# Patient Record
Sex: FEMALE | Race: WHITE | Employment: OTHER | URBAN - METROPOLITAN AREA
[De-identification: names, ages, dates, MRNs, and addresses within clinical notes are randomized per-mention and may not be internally consistent; named-entity substitution may affect disease eponyms.]

---

## 2018-03-11 ENCOUNTER — HOSPITAL ENCOUNTER (EMERGENCY)
Age: 53
Discharge: HOME | End: 2018-03-11
Attending: EMERGENCY MEDICINE
Payer: COMMERCIAL

## 2018-03-11 VITALS
HEART RATE: 100 BPM | TEMPERATURE: 97.5 F | DIASTOLIC BLOOD PRESSURE: 101 MMHG | BODY MASS INDEX: 17.83 KG/M2 | SYSTOLIC BLOOD PRESSURE: 143 MMHG | HEIGHT: 65 IN | OXYGEN SATURATION: 100 % | RESPIRATION RATE: 16 BRPM | WEIGHT: 107 LBS

## 2018-03-11 DIAGNOSIS — T78.40XA ALLERGIC REACTION, INITIAL ENCOUNTER: Primary | ICD-10-CM

## 2018-03-11 PROCEDURE — 74011250636 HC RX REV CODE- 250/636: Performed by: EMERGENCY MEDICINE

## 2018-03-11 PROCEDURE — 74011000250 HC RX REV CODE- 250: Performed by: EMERGENCY MEDICINE

## 2018-03-11 PROCEDURE — 96374 THER/PROPH/DIAG INJ IV PUSH: CPT

## 2018-03-11 PROCEDURE — 74011636637 HC RX REV CODE- 636/637: Performed by: EMERGENCY MEDICINE

## 2018-03-11 PROCEDURE — 96375 TX/PRO/DX INJ NEW DRUG ADDON: CPT

## 2018-03-11 PROCEDURE — 99283 EMERGENCY DEPT VISIT LOW MDM: CPT

## 2018-03-11 RX ORDER — PREDNISONE 10 MG/1
TABLET ORAL
Qty: 1 PACKAGE | Refills: 0 | Status: SHIPPED | OUTPATIENT
Start: 2018-03-11

## 2018-03-11 RX ORDER — DIPHENHYDRAMINE HYDROCHLORIDE 50 MG/ML
25 INJECTION, SOLUTION INTRAMUSCULAR; INTRAVENOUS
Status: COMPLETED | OUTPATIENT
Start: 2018-03-11 | End: 2018-03-11

## 2018-03-11 RX ORDER — FAMOTIDINE 10 MG/ML
20 INJECTION INTRAVENOUS
Status: DISCONTINUED | OUTPATIENT
Start: 2018-03-11 | End: 2018-03-11 | Stop reason: SDUPTHER

## 2018-03-11 RX ORDER — PROCHLORPERAZINE EDISYLATE 5 MG/ML
10 INJECTION INTRAMUSCULAR; INTRAVENOUS
Status: COMPLETED | OUTPATIENT
Start: 2018-03-11 | End: 2018-03-11

## 2018-03-11 RX ORDER — PREDNISONE 20 MG/1
60 TABLET ORAL
Status: COMPLETED | OUTPATIENT
Start: 2018-03-11 | End: 2018-03-11

## 2018-03-11 RX ADMIN — PREDNISONE 60 MG: 20 TABLET ORAL at 16:07

## 2018-03-11 RX ADMIN — PROCHLORPERAZINE EDISYLATE 10 MG: 5 INJECTION INTRAMUSCULAR; INTRAVENOUS at 16:08

## 2018-03-11 RX ADMIN — FAMOTIDINE 20 MG: 10 INJECTION, SOLUTION INTRAVENOUS at 16:08

## 2018-03-11 RX ADMIN — DIPHENHYDRAMINE HYDROCHLORIDE 25 MG: 50 INJECTION, SOLUTION INTRAMUSCULAR; INTRAVENOUS at 16:07

## 2018-03-11 NOTE — ED TRIAGE NOTES
Patient arrives from home for redness to face for 50 days. Patient saw a eye doctor twice for this issue of redness under her eyes but she does not believe she knows whats going on. \"Its moving. \" (in reguards to redness) Patient also saw primary care for this issue.

## 2018-03-11 NOTE — DISCHARGE INSTRUCTIONS
Allergic Reaction: Care Instructions  Your Care Instructions    An allergic reaction is an excessive response from your immune system to a medicine, chemical, food, insect bite, or other substance. A reaction can range from mild to life-threatening. Some people have a mild rash, hives, and itching or stomach cramps. In severe reactions, swelling of your tongue and throat can close up your airway so that you cannot breathe. Follow-up care is a key part of your treatment and safety. Be sure to make and go to all appointments, and call your doctor if you are having problems. It's also a good idea to know your test results and keep a list of the medicines you take. How can you care for yourself at home? · If you know what caused your allergic reaction, be sure to avoid it. Your allergy may become more severe each time you have a reaction. · Take an over-the-counter antihistamine, such as cetirizine (Zyrtec) or loratadine (Claritin), to treat mild symptoms. Read and follow directions on the label. Some antihistamines can make you feel sleepy. Do not give antihistamines to a child unless you have checked with your doctor first. Mild symptoms include sneezing or an itchy or runny nose; an itchy mouth; a few hives or mild itching; and mild nausea or stomach discomfort. · Do not scratch hives or a rash. Put a cold, moist towel on them or take cool baths to relieve itching. Put ice packs on hives, swelling, or insect stings for 10 to 15 minutes at a time. Put a thin cloth between the ice pack and your skin. Do not take hot baths or showers. They will make the itching worse. · Your doctor may prescribe a shot of epinephrine to carry with you in case you have a severe reaction. Learn how to give yourself the shot and keep it with you at all times. Make sure it is not . · Go to the emergency room every time you have a severe reaction, even if you have used your shot of epinephrine and are feeling better. Symptoms can come back after a shot. · Wear medical alert jewelry that lists your allergies. You can buy this at most drugstores. · If your child has a severe allergy, make sure that his or her teachers, babysitters, coaches, and other caregivers know about the allergy. They should have an epinephrine shot, know how and when to give it, and have a plan to take your child to the hospital.  When should you call for help? Give an epinephrine shot if:  ? · You think you are having a severe allergic reaction. ? · You have symptoms in more than one body area, such as mild nausea and an itchy mouth. ? After giving an epinephrine shot call 911, even if you feel better. ?Call 911 if:  ? · You have symptoms of a severe allergic reaction. These may include:  ¨ Sudden raised, red areas (hives) all over your body. ¨ Swelling of the throat, mouth, lips, or tongue. ¨ Trouble breathing. ¨ Passing out (losing consciousness). Or you may feel very lightheaded or suddenly feel weak, confused, or restless. ? · You have been given an epinephrine shot, even if you feel better. ?Call your doctor now or seek immediate medical care if:  ? · You have symptoms of an allergic reaction, such as:  ¨ A rash or hives (raised, red areas on the skin). ¨ Itching. ¨ Swelling. ¨ Belly pain, nausea, or vomiting. ? Watch closely for changes in your health, and be sure to contact your doctor if:  ? · You do not get better as expected. Where can you learn more? Go to http://freedom-mine.info/. Enter V363 in the search box to learn more about \"Allergic Reaction: Care Instructions. \"  Current as of: September 29, 2016  Content Version: 11.4  © 7151-1047 [employer]. Care instructions adapted under license by [employer] (which disclaims liability or warranty for this information).  If you have questions about a medical condition or this instruction, always ask your healthcare professional. [employer], [facility] disclaims any warranty or liability for your use of this information. We hope that we have addressed all of your medical concerns. The examination and treatment you received in the Emergency Department were for an emergent problem and were not intended as complete care. It is important that you follow up with your healthcare provider(s) for ongoing care. If your symptoms worsen or do not improve as expected, and you are unable to reach your usual health care provider(s), you should return to the Emergency Department. Today's healthcare is undergoing tremendous change, and patient satisfaction surveys are one of the many tools to assess the quality of medical care. You may receive a survey from the [employer] regarding your experience in the Emergency Department. I hope that your experience has been completely positive, particularly the medical care that I provided. As such, please participate in the survey; anything less than excellent does not meet my expectations or intentions. Thank you for allowing us to provide you with medical care today. We realize that you have many choices for your emergency care needs. Please choose us in the future for any continued health care needs. Darrick Che Claudean Freeze, [address]: [phone]            No results found for this or any previous visit (from the past 24 hour(s)). No results found.

## 2018-03-11 NOTE — ED NOTES
Pt rips IV out after medication administered, pt states 'i accidentally grabbed it, I just wannt go home'. MD notified and aware. Pt left before discharge instructions or vitals, pt ambulatory with steady gait and in no signs of distress.

## 2018-03-11 NOTE — PROGRESS NOTES

## 2018-03-11 NOTE — ED PROVIDER NOTES
HPI Comments: 47 y.o. female with past medical history significant for anxiety who presents from home with chief complaint of rash. Patient reports having a \"blister\" on the right side of her face on 1/21/18 (1.5 months ago) which caused redness and pain in her right eye. She reports being seen by her ophthalmologist twice, who diagnosed her with herpes zoster and prescribed her Valtrex. Patient reports taking the valtrex with some relief, but states that over the past 1 week, her rash has increased, transferred to the left side of her face and has spread down her neck. She states that the rash is painful, similar to a \"burning\" and states that her face feels like it is \"on fire\". Patient also reports having some associated headaches and constant blurred vision. There are no other acute medical concerns at this time. Social hx: every day smoker, + EtOH use, no drug use  PCP: No primary care provider on file. Opthalmology: Jatin Agrawal MD    Note written by Villa López, as dictated by Meron Osuna MD 3:23 PM      The history is provided by the patient. No  was used. Past Medical History:   Diagnosis Date    Anxiety        History reviewed. No pertinent surgical history. History reviewed. No pertinent family history. Social History     Social History    Marital status:      Spouse name: N/A    Number of children: N/A    Years of education: N/A     Occupational History    Not on file. Social History Main Topics    Smoking status: Current Every Day Smoker    Smokeless tobacco: Current User    Alcohol use Yes    Drug use: No    Sexual activity: Not on file     Other Topics Concern    Not on file     Social History Narrative    No narrative on file         ALLERGIES: Review of patient's allergies indicates no known allergies. Review of Systems   Constitutional: Negative for chills and fever.    Eyes: Positive for redness and visual disturbance. Skin: Positive for rash. Neurological: Positive for headaches. All other systems reviewed and are negative. Vitals:    03/11/18 1435   BP: (!) 143/101   Pulse: 100   Resp: 16   Temp: 97.5 °F (36.4 °C)   SpO2: 100%   Weight: 48.5 kg (107 lb)   Height: 5' 5\" (1.651 m)            Physical Exam   Constitutional: She is oriented to person, place, and time. She appears well-developed and well-nourished. No distress. HENT:   Head: Normocephalic and atraumatic. Eyes: Conjunctivae and EOM are normal. No scleral icterus. Periorbital erythema, non-cellulitic. Eyes appear normal.   Neck: Neck supple. No tracheal deviation present. Cardiovascular: Normal rate, regular rhythm, normal heart sounds and intact distal pulses. Exam reveals no gallop and no friction rub. No murmur heard. Pulmonary/Chest: Effort normal and breath sounds normal. She has no wheezes. She has no rales. Abdominal: Soft. She exhibits no distension. There is no tenderness. There is no rebound and no guarding. Musculoskeletal: She exhibits no edema. Neurological: She is alert and oriented to person, place, and time. Skin: Skin is warm and dry. No rash noted. Psychiatric: She has a normal mood and affect. Nursing note and vitals reviewed. Note written by Villa Lewis, as dictated by Rebecca Bruno MD 3:23 PM    [facility]      ED Course       Procedures  CONSULT NOTE:  3:58 PM Rebecca Bruno MD spoke with Dr. Renu Real, Consult for opthalmology. Discussed available diagnostic tests and clinical findings. Dr. Christelle Lucas will see patient tomorrow.

## 2023-04-18 NOTE — ED NOTES
Dr. Maribel Patricio at bedside to give written prescriptions after d/c instructions explained. Pt walked out of ED before getting written prescriptions. No

## 2025-03-08 ENCOUNTER — APPOINTMENT (OUTPATIENT)
Facility: HOSPITAL | Age: 60
DRG: 062 | End: 2025-03-08

## 2025-03-08 ENCOUNTER — HOSPITAL ENCOUNTER (INPATIENT)
Facility: HOSPITAL | Age: 60
LOS: 2 days | Discharge: HOME | DRG: 062 | End: 2025-03-10
Attending: STUDENT IN AN ORGANIZED HEALTH CARE EDUCATION/TRAINING PROGRAM | Admitting: INTERNAL MEDICINE

## 2025-03-08 DIAGNOSIS — I63.9 CEREBROVASCULAR ACCIDENT (CVA), UNSPECIFIED MECHANISM (HCC): Primary | ICD-10-CM

## 2025-03-08 LAB
ALBUMIN SERPL-MCNC: 3.4 G/DL (ref 3.5–5)
ALBUMIN/GLOB SERPL: 0.9 (ref 1.1–2.2)
ALP SERPL-CCNC: 128 U/L (ref 45–117)
ALT SERPL-CCNC: 15 U/L (ref 12–78)
ANION GAP SERPL CALC-SCNC: 4 MMOL/L (ref 2–12)
AST SERPL-CCNC: 20 U/L (ref 15–37)
BASOPHILS # BLD: 0.02 K/UL (ref 0–0.1)
BASOPHILS NFR BLD: 0.2 % (ref 0–1)
BILIRUB SERPL-MCNC: <0.1 MG/DL (ref 0.2–1)
BUN SERPL-MCNC: 14 MG/DL (ref 6–20)
BUN/CREAT SERPL: 27 (ref 12–20)
CALCIUM SERPL-MCNC: 8.7 MG/DL (ref 8.5–10.1)
CHLORIDE SERPL-SCNC: 111 MMOL/L (ref 97–108)
CO2 SERPL-SCNC: 26 MMOL/L (ref 21–32)
COMMENT:: NORMAL
CREAT SERPL-MCNC: 0.52 MG/DL (ref 0.55–1.02)
DIFFERENTIAL METHOD BLD: NORMAL
EKG ATRIAL RATE: 88 BPM
EKG DIAGNOSIS: NORMAL
EKG P AXIS: 81 DEGREES
EKG P-R INTERVAL: 174 MS
EKG Q-T INTERVAL: 370 MS
EKG QRS DURATION: 70 MS
EKG QTC CALCULATION (BAZETT): 447 MS
EKG R AXIS: 86 DEGREES
EKG T AXIS: 72 DEGREES
EKG VENTRICULAR RATE: 88 BPM
EOSINOPHIL # BLD: 0.2 K/UL (ref 0–0.4)
EOSINOPHIL NFR BLD: 2.4 % (ref 0–7)
ERYTHROCYTE [DISTWIDTH] IN BLOOD BY AUTOMATED COUNT: 12.2 % (ref 11.5–14.5)
GLOBULIN SER CALC-MCNC: 3.7 G/DL (ref 2–4)
GLUCOSE BLD STRIP.AUTO-MCNC: 80 MG/DL (ref 65–117)
GLUCOSE SERPL-MCNC: 94 MG/DL (ref 65–100)
HCT VFR BLD AUTO: 38.4 % (ref 35–47)
HGB BLD-MCNC: 12.8 G/DL (ref 11.5–16)
IMM GRANULOCYTES # BLD AUTO: 0.03 K/UL (ref 0–0.04)
IMM GRANULOCYTES NFR BLD AUTO: 0.4 % (ref 0–0.5)
INR PPP: 0.9 (ref 0.9–1.1)
LYMPHOCYTES # BLD: 3.04 K/UL (ref 0.8–3.5)
LYMPHOCYTES NFR BLD: 35.7 % (ref 12–49)
MCH RBC QN AUTO: 31.8 PG (ref 26–34)
MCHC RBC AUTO-ENTMCNC: 33.3 G/DL (ref 30–36.5)
MCV RBC AUTO: 95.3 FL (ref 80–99)
MONOCYTES # BLD: 0.64 K/UL (ref 0–1)
MONOCYTES NFR BLD: 7.5 % (ref 5–13)
NEUTS SEG # BLD: 4.58 K/UL (ref 1.8–8)
NEUTS SEG NFR BLD: 53.8 % (ref 32–75)
NRBC # BLD: 0 K/UL (ref 0–0.01)
NRBC BLD-RTO: 0 PER 100 WBC
PLATELET # BLD AUTO: 276 K/UL (ref 150–400)
PMV BLD AUTO: 9.5 FL (ref 8.9–12.9)
POTASSIUM SERPL-SCNC: 4 MMOL/L (ref 3.5–5.1)
PROT SERPL-MCNC: 7.1 G/DL (ref 6.4–8.2)
PROTHROMBIN TIME: 9.5 SEC (ref 9.2–11.2)
RBC # BLD AUTO: 4.03 M/UL (ref 3.8–5.2)
SERVICE CMNT-IMP: NORMAL
SODIUM SERPL-SCNC: 141 MMOL/L (ref 136–145)
SPECIMEN HOLD: NORMAL
TROPONIN I SERPL HS-MCNC: 4 NG/L (ref 0–51)
WBC # BLD AUTO: 8.5 K/UL (ref 3.6–11)

## 2025-03-08 PROCEDURE — 80053 COMPREHEN METABOLIC PANEL: CPT

## 2025-03-08 PROCEDURE — 85610 PROTHROMBIN TIME: CPT

## 2025-03-08 PROCEDURE — 84484 ASSAY OF TROPONIN QUANT: CPT

## 2025-03-08 PROCEDURE — APPNB60 APP NON BILLABLE TIME 46-60 MINS: Performed by: NURSE PRACTITIONER

## 2025-03-08 PROCEDURE — 85025 COMPLETE CBC W/AUTO DIFF WBC: CPT

## 2025-03-08 PROCEDURE — 2500000003 HC RX 250 WO HCPCS: Performed by: STUDENT IN AN ORGANIZED HEALTH CARE EDUCATION/TRAINING PROGRAM

## 2025-03-08 PROCEDURE — 96375 TX/PRO/DX INJ NEW DRUG ADDON: CPT

## 2025-03-08 PROCEDURE — 37195 THROMBOLYTIC THERAPY STROKE: CPT

## 2025-03-08 PROCEDURE — 3E03317 INTRODUCTION OF OTHER THROMBOLYTIC INTO PERIPHERAL VEIN, PERCUTANEOUS APPROACH: ICD-10-PCS | Performed by: STUDENT IN AN ORGANIZED HEALTH CARE EDUCATION/TRAINING PROGRAM

## 2025-03-08 PROCEDURE — 70450 CT HEAD/BRAIN W/O DYE: CPT

## 2025-03-08 PROCEDURE — 96374 THER/PROPH/DIAG INJ IV PUSH: CPT

## 2025-03-08 PROCEDURE — 6360000004 HC RX CONTRAST MEDICATION: Performed by: EMERGENCY MEDICINE

## 2025-03-08 PROCEDURE — 6360000002 HC RX W HCPCS

## 2025-03-08 PROCEDURE — 2500000003 HC RX 250 WO HCPCS: Performed by: PHYSICIAN ASSISTANT

## 2025-03-08 PROCEDURE — 0042T CT BRAIN PERFUSION: CPT

## 2025-03-08 PROCEDURE — 2000000000 HC ICU R&B

## 2025-03-08 PROCEDURE — 99285 EMERGENCY DEPT VISIT HI MDM: CPT

## 2025-03-08 PROCEDURE — 6370000000 HC RX 637 (ALT 250 FOR IP): Performed by: PHYSICIAN ASSISTANT

## 2025-03-08 PROCEDURE — 6360000002 HC RX W HCPCS: Performed by: STUDENT IN AN ORGANIZED HEALTH CARE EDUCATION/TRAINING PROGRAM

## 2025-03-08 PROCEDURE — 4A03X5D MEASUREMENT OF ARTERIAL FLOW, INTRACRANIAL, EXTERNAL APPROACH: ICD-10-PCS | Performed by: STUDENT IN AN ORGANIZED HEALTH CARE EDUCATION/TRAINING PROGRAM

## 2025-03-08 PROCEDURE — 93010 ELECTROCARDIOGRAM REPORT: CPT | Performed by: INTERNAL MEDICINE

## 2025-03-08 PROCEDURE — 70498 CT ANGIOGRAPHY NECK: CPT

## 2025-03-08 PROCEDURE — 93005 ELECTROCARDIOGRAM TRACING: CPT | Performed by: STUDENT IN AN ORGANIZED HEALTH CARE EDUCATION/TRAINING PROGRAM

## 2025-03-08 PROCEDURE — 82962 GLUCOSE BLOOD TEST: CPT

## 2025-03-08 RX ORDER — ASPIRIN 81 MG/1
81 TABLET, CHEWABLE ORAL DAILY
Status: DISCONTINUED | OUTPATIENT
Start: 2025-03-09 | End: 2025-03-10 | Stop reason: HOSPADM

## 2025-03-08 RX ORDER — SODIUM CHLORIDE 9 MG/ML
INJECTION, SOLUTION INTRAVENOUS PRN
Status: DISCONTINUED | OUTPATIENT
Start: 2025-03-08 | End: 2025-03-10 | Stop reason: HOSPADM

## 2025-03-08 RX ORDER — SODIUM CHLORIDE 0.9 % (FLUSH) 0.9 %
5-40 SYRINGE (ML) INJECTION EVERY 12 HOURS SCHEDULED
Status: DISCONTINUED | OUTPATIENT
Start: 2025-03-08 | End: 2025-03-10 | Stop reason: HOSPADM

## 2025-03-08 RX ORDER — SODIUM CHLORIDE 0.9 % (FLUSH) 0.9 %
5-40 SYRINGE (ML) INJECTION PRN
Status: DISCONTINUED | OUTPATIENT
Start: 2025-03-08 | End: 2025-03-10 | Stop reason: HOSPADM

## 2025-03-08 RX ORDER — LABETALOL HYDROCHLORIDE 5 MG/ML
10 INJECTION, SOLUTION INTRAVENOUS EVERY 6 HOURS PRN
Status: DISCONTINUED | OUTPATIENT
Start: 2025-03-08 | End: 2025-03-10 | Stop reason: HOSPADM

## 2025-03-08 RX ORDER — LABETALOL HYDROCHLORIDE 5 MG/ML
10 INJECTION, SOLUTION INTRAVENOUS EVERY 6 HOURS
Status: DISCONTINUED | OUTPATIENT
Start: 2025-03-08 | End: 2025-03-08

## 2025-03-08 RX ORDER — ONDANSETRON 4 MG/1
4 TABLET, ORALLY DISINTEGRATING ORAL EVERY 8 HOURS PRN
Status: DISCONTINUED | OUTPATIENT
Start: 2025-03-08 | End: 2025-03-10 | Stop reason: HOSPADM

## 2025-03-08 RX ORDER — ASPIRIN 300 MG/1
300 SUPPOSITORY RECTAL DAILY
Status: DISCONTINUED | OUTPATIENT
Start: 2025-03-09 | End: 2025-03-09

## 2025-03-08 RX ORDER — ONDANSETRON 2 MG/ML
4 INJECTION INTRAMUSCULAR; INTRAVENOUS ONCE
Status: COMPLETED | OUTPATIENT
Start: 2025-03-08 | End: 2025-03-08

## 2025-03-08 RX ORDER — LABETALOL HYDROCHLORIDE 5 MG/ML
10 INJECTION, SOLUTION INTRAVENOUS ONCE
Status: DISCONTINUED | OUTPATIENT
Start: 2025-03-08 | End: 2025-03-08

## 2025-03-08 RX ORDER — ALPRAZOLAM 0.25 MG
0.25 TABLET ORAL 2 TIMES DAILY PRN
Status: DISCONTINUED | OUTPATIENT
Start: 2025-03-08 | End: 2025-03-08

## 2025-03-08 RX ORDER — IOPAMIDOL 755 MG/ML
40 INJECTION, SOLUTION INTRAVASCULAR
Status: COMPLETED | OUTPATIENT
Start: 2025-03-08 | End: 2025-03-08

## 2025-03-08 RX ORDER — POLYETHYLENE GLYCOL 3350 17 G/17G
17 POWDER, FOR SOLUTION ORAL DAILY PRN
Status: DISCONTINUED | OUTPATIENT
Start: 2025-03-08 | End: 2025-03-10 | Stop reason: HOSPADM

## 2025-03-08 RX ORDER — ALPRAZOLAM 0.5 MG
0.5 TABLET ORAL 2 TIMES DAILY PRN
Status: DISCONTINUED | OUTPATIENT
Start: 2025-03-08 | End: 2025-03-10 | Stop reason: HOSPADM

## 2025-03-08 RX ORDER — ENOXAPARIN SODIUM 100 MG/ML
40 INJECTION SUBCUTANEOUS DAILY
Status: DISCONTINUED | OUTPATIENT
Start: 2025-03-09 | End: 2025-03-10 | Stop reason: HOSPADM

## 2025-03-08 RX ORDER — ONDANSETRON 2 MG/ML
INJECTION INTRAMUSCULAR; INTRAVENOUS
Status: COMPLETED
Start: 2025-03-08 | End: 2025-03-08

## 2025-03-08 RX ORDER — METOCLOPRAMIDE HYDROCHLORIDE 5 MG/ML
10 INJECTION INTRAMUSCULAR; INTRAVENOUS
Status: COMPLETED | OUTPATIENT
Start: 2025-03-08 | End: 2025-03-08

## 2025-03-08 RX ORDER — ONDANSETRON 2 MG/ML
4 INJECTION INTRAMUSCULAR; INTRAVENOUS EVERY 6 HOURS PRN
Status: DISCONTINUED | OUTPATIENT
Start: 2025-03-08 | End: 2025-03-10 | Stop reason: HOSPADM

## 2025-03-08 RX ORDER — SODIUM CHLORIDE 0.9 % (FLUSH) 0.9 %
10 SYRINGE (ML) INJECTION ONCE
Status: COMPLETED | OUTPATIENT
Start: 2025-03-08 | End: 2025-03-08

## 2025-03-08 RX ORDER — BUTALBITAL, ACETAMINOPHEN AND CAFFEINE 50; 325; 40 MG/1; MG/1; MG/1
1 TABLET ORAL EVERY 4 HOURS PRN
Status: DISCONTINUED | OUTPATIENT
Start: 2025-03-08 | End: 2025-03-10 | Stop reason: HOSPADM

## 2025-03-08 RX ORDER — ATORVASTATIN CALCIUM 40 MG/1
80 TABLET, FILM COATED ORAL NIGHTLY
Status: DISCONTINUED | OUTPATIENT
Start: 2025-03-08 | End: 2025-03-10 | Stop reason: HOSPADM

## 2025-03-08 RX ORDER — LABETALOL HYDROCHLORIDE 5 MG/ML
INJECTION, SOLUTION INTRAVENOUS
Status: DISPENSED
Start: 2025-03-08 | End: 2025-03-08

## 2025-03-08 RX ORDER — IOPAMIDOL 755 MG/ML
80 INJECTION, SOLUTION INTRAVASCULAR
Status: COMPLETED | OUTPATIENT
Start: 2025-03-08 | End: 2025-03-08

## 2025-03-08 RX ADMIN — BUTALBITAL, ACETAMINOPHEN AND CAFFEINE 1 TABLET: 325; 50; 40 TABLET ORAL at 14:11

## 2025-03-08 RX ADMIN — Medication 10 ML: at 12:00

## 2025-03-08 RX ADMIN — SODIUM CHLORIDE, PRESERVATIVE FREE 10 ML: 5 INJECTION INTRAVENOUS at 09:32

## 2025-03-08 RX ADMIN — ONDANSETRON 4 MG: 2 INJECTION INTRAMUSCULAR; INTRAVENOUS at 09:06

## 2025-03-08 RX ADMIN — Medication 20 ML: at 21:00

## 2025-03-08 RX ADMIN — ATORVASTATIN CALCIUM 80 MG: 40 TABLET, FILM COATED ORAL at 21:47

## 2025-03-08 RX ADMIN — IOPAMIDOL 40 ML: 755 INJECTION, SOLUTION INTRAVENOUS at 09:38

## 2025-03-08 RX ADMIN — IOPAMIDOL 80 ML: 755 INJECTION, SOLUTION INTRAVENOUS at 09:40

## 2025-03-08 RX ADMIN — BUTALBITAL, ACETAMINOPHEN AND CAFFEINE 1 TABLET: 325; 50; 40 TABLET ORAL at 21:47

## 2025-03-08 RX ADMIN — SODIUM CHLORIDE, PRESERVATIVE FREE 10 ML: 5 INJECTION INTRAVENOUS at 09:30

## 2025-03-08 RX ADMIN — Medication 13 MG: at 09:31

## 2025-03-08 RX ADMIN — ALPRAZOLAM 0.5 MG: 0.5 TABLET ORAL at 19:06

## 2025-03-08 RX ADMIN — ONDANSETRON 4 MG: 2 INJECTION, SOLUTION INTRAMUSCULAR; INTRAVENOUS at 09:06

## 2025-03-08 RX ADMIN — METOCLOPRAMIDE HYDROCHLORIDE 10 MG: 5 INJECTION INTRAMUSCULAR; INTRAVENOUS at 10:08

## 2025-03-08 RX ADMIN — ALPRAZOLAM 0.25 MG: 0.25 TABLET ORAL at 17:42

## 2025-03-08 ASSESSMENT — PAIN DESCRIPTION - ORIENTATION
ORIENTATION: POSTERIOR
ORIENTATION: POSTERIOR
ORIENTATION: ANTERIOR
ORIENTATION: POSTERIOR

## 2025-03-08 ASSESSMENT — PAIN DESCRIPTION - PAIN TYPE
TYPE: ACUTE PAIN

## 2025-03-08 ASSESSMENT — PAIN DESCRIPTION - FREQUENCY
FREQUENCY: INTERMITTENT

## 2025-03-08 ASSESSMENT — PAIN DESCRIPTION - ONSET
ONSET: ON-GOING

## 2025-03-08 ASSESSMENT — PAIN DESCRIPTION - LOCATION
LOCATION: HEAD

## 2025-03-08 ASSESSMENT — PAIN SCALES - GENERAL
PAINLEVEL_OUTOF10: 5
PAINLEVEL_OUTOF10: 8
PAINLEVEL_OUTOF10: 2
PAINLEVEL_OUTOF10: 5
PAINLEVEL_OUTOF10: 4
PAINLEVEL_OUTOF10: 10
PAINLEVEL_OUTOF10: 3
PAINLEVEL_OUTOF10: 2

## 2025-03-08 ASSESSMENT — PAIN DESCRIPTION - DESCRIPTORS
DESCRIPTORS: PRESSURE
DESCRIPTORS: ACHING
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
## 2025-03-08 NOTE — ED NOTES
TRANSFER - OUT REPORT:    Verbal report given to LUIS FERNANDO Valladares on Brie Velazco  being transferred to ICU 7101-01 for routine progression of patient care       Report consisted of patient's Situation, Background, Assessment and   Recommendations(SBAR).     Information from the following report(s) Nurse Handoff Report, ED SBAR, MAR, and Recent Results was reviewed with the receiving nurse.    [place] Fall Assessment:    Presents to emergency department  because of falls (Syncope, seizure, or loss of consciousness): No  Age > 70: No  Altered Mental Status, Intoxication with alcohol or substance confusion (Disorientation, impaired judgment, poor safety awaremess, or inability to follow instructions): No  Impaired Mobility: Ambulates or transfers with assistive devices or assistance; Unable to ambulate or transer.: Yes  Nursing Judgement: Yes          Lines:   Peripheral IV 03/08/25 Right Antecubital (Active)   Site Assessment Clean, dry & intact 03/08/25 0901   Line Status Blood return noted;Flushed;Normal saline locked;Specimen collected 03/08/25 0901   Line Care Connections checked and tightened 03/08/25 0901   Phlebitis Assessment No symptoms 03/08/25 0901   Infiltration Assessment 0 03/08/25 0901   Dressing Status Clean, dry & intact;New dressing applied 03/08/25 0901   Dressing Type Transparent 03/08/25 0901   Dressing Intervention New 03/08/25 0901       Peripheral IV 03/08/25 Left Antecubital (Active)   Line Status Blood return noted;Flushed;Normal saline locked 03/08/25 0917   Line Care Connections checked and tightened 03/08/25 0917   Phlebitis Assessment No symptoms 03/08/25 0917   Infiltration Assessment 0 03/08/25 0917   Dressing Status New dressing applied;Clean, dry & intact 03/08/25 0917   Dressing Type Transparent 03/08/25 0917   Dressing Intervention New 03/08/25 0917        Opportunity for questions and clarification was provided.      Patient transported with:  Monitor and Registered Nurse

## 2025-03-08 NOTE — ED TRIAGE NOTES
Patient arrives ambulatory with complaints of left sided tingling, weakness, HA that woke her up. Went to sleep around 0500 was normal. +nausea, +dizziness.     LKW 0500  BG 80  /108    Took 3 ASA PTA. Yuko KIMBLE assessing during triage

## 2025-03-08 NOTE — ED PROVIDER NOTES
[facility] 7S1 INTENSIVE CARE  EMERGENCY DEPARTMENT ENCOUNTER      Pt Name: Brie Velazco  MRN: [id number]  Birthdate 1965  Date of evaluation: 3/8/2025  Provider: Mildred Huntley MD    CHIEF COMPLAINT       Chief Complaint   Patient presents with    Extremity Weakness       ALLERGIES     Patient has no known allergies.    ENCOUNTER     HISTORY OF PRESENT ILLNESS    A 59-year-old female with a past medical history significant for traumatic brain injury and migraines presented to the Emergency Department. The history was provided by the patient and her spouse. She reports feeling fine at 6 AM and was up chatting with her . However, she went back to sleep and woke up shortly before arrival with a severe headache. She describes the headache as a throbbing pain around her eye, wrapping from the back to the front of her head. The patient continues to experience severe headache, dizziness, and nausea. She also reports numbness on the left side, including her tongue, left arm, and left lower extremity, along with weakness in her left upper and lower extremity. She denies any history of stroke, anticoagulant use, and hypertension. Prior to arrival, she took 325 mg of aspirin and a prescribed migraine medication, the name of which she is uncertain.    HEALTHCARE PROVIDERS    - Patient follows with a neurologist since TBI in approximately 2015 and for management of migraines    PAST MEDICAL HISTORY    - History of traumatic brain injury (TBI) and migraines.    PHYSICAL EXAM    Vitals: Heart rate 106 bpm (tachycardia), blood pressure 200/108 mmHg (elevated), respiratory rate 18 breaths per minute, SpO2 100% on room air, temperature 36.4°C.    General: NAD. Well-kept female adult.    Eyes: Appear normal with no scleral icterus.    HENT: Atraumatic. Moist mucous membranes, no pharyngeal erythema, edema or lesions.    Neck: Atraumatic, supple.    Cardiac: Tachycardic, regular rhythm, no significant murmurs  Department Physician who either signs or Co-signs this chart in the absence of a cardiologist.      RADIOLOGY:   Non-plain film images such as CT, Ultrasound and MRI are read by the radiologist. Plain radiographic images are visualized and preliminarily interpreted by the emergency physician.    Interpretation per the Radiologist below, if available at the time of this note:    CTA HEAD NECK W CONTRAST   Final Result   No acute large vessel occlusion or perfusion abnormality.   Focal stenosis of mid left cervical vertebral artery      Electronically signed by HERNESTO NOVAK      CT BRAIN PERFUSION   Final Result   No acute large vessel occlusion or perfusion abnormality.   Focal stenosis of mid left cervical vertebral artery      Electronically signed by HERNESTO NOVAK      CT HEAD WO CONTRAST   Final Result   No acute intracranial abnormality.            Electronically signed by PATRICIA HERNANDEZ      CT head without contrast    (Results Pending)   MRI brain without contrast    (Results Pending)        LABS:  Labs Reviewed   COMPREHENSIVE METABOLIC PANEL - Abnormal; Notable for the following components:       Result Value    Chloride 111 (*)     Creatinine 0.52 (*)     BUN/Creatinine Ratio 27 (*)     Total Bilirubin <0.1 (*)     Alk Phosphatase 128 (*)     Albumin 3.4 (*)     Albumin/Globulin Ratio 0.9 (*)     All other components within normal limits   CBC WITH AUTO DIFFERENTIAL   PROTIME-INR   TROPONIN   EXTRA TUBES HOLD   EXTRA TUBE, BLOOD BANK   BASIC METABOLIC PANEL   POCT GLUCOSE   POCT GLUCOSE       CONSULTS:  IP CONSULT TO TELE-NEUROLOGY  IP CONSULT TO NEUROLOGY    PROCEDURES:     Procedures      FINAL IMPRESSION      1. Cerebrovascular accident (CVA), unspecified mechanism (HCC)          DISPOSITION/PLAN   DISPOSITION Admitted 03/08/2025 10:34:59 AM      (Please note that portions of this note were completed with a transcription program.  Efforts were made to edit the dictations but occasionally words are

## 2025-03-08 NOTE — H&P
CRITICAL CARE ADMISSION NOTE      Name: Brie Velazco   : 1965   MRN: [id number]   Date: 3/8/2025      Reason for ICU Admission: Acute CVA status post TNK    ASSESSMENT and PLAN     Acute CVA   - Admit to ICU s/p TNK given 3/8 at 0931   - Neurology following   - Q1H neurochecks   - STAT head CT for acute neurological deterioration, new headache or N/V   - BP goal -180, DBP    - MRI brain 24 hours post TNK   - Hold any ASA/anticoagulants for at least 24 hours, until repeat imaging obtained   - 2D Echo   - PT/OT consults   - Speech language pathology consultation    Hypertensive emergency   - Will start nicardipine gtt if exceeds .   - Labetalol 10 mg PRN for goal systolic blood pressure less than 180    Generalized anxiety disorder   - Prescribed 0.5 mg alprazolam twice daily as needed for anxiety.  Reports she takes half a tablet.  Ordered 0.25 mg alprazolam twice daily as needed for anxiety.      HISTORY OF PRESENT ILLNESS:     This is a 59-year-old female with previous medical history of migraines followed outpatient by neurology, anxiety, remote TBI.  She reports she was awake this morning around 6 AM and reports no complaints at that time.  She went back to bed and woke up with severe headache.  She immediately presented to [facility] emergency department.  She is admitted to the ICU for post TNK monitoring.  She reports left-sided tongue numbness improved since arrival.  She reports paresthesias in the left upper extremity improved since admission.  And she reports numbness of the left lower extremity which has resolved.    ICU DAILY CHECKLIST     Code Status: Full  DVT Prophylaxis: Lovenox after negative head CT  T/L/D: Peripheral IV  SUP: Not indicated  Diet: N.p.o. speech-language pathology consulted.  Activity Level: Bedrest elevate head of  ABCDEF Bundle/Checklist Completed:Yes  Disposition: Stay in ICU  Multidisciplinary Rounds Completed: Pending  Patient/Family    Pulmonary:      Effort: Pulmonary effort is normal. No respiratory distress.   Abdominal:      General: There is no distension.      Palpations: Abdomen is soft.      Tenderness: There is no abdominal tenderness.   Musculoskeletal:      Cervical back: Neck supple.   Skin:     General: Skin is warm and dry.   Neurological:      General: No focal deficit present.      Mental Status: She is alert and oriented to person, place, and time.         Labs and Data: Reviewed 03/08/25  Recent Labs     03/08/25  0900   WBC 8.5   RBC 4.03   HGB 12.8   HCT 38.4   MCV 95.3   RDW 12.2        Recent Labs     03/08/25  0900      K 4.0   *   CO2 26   BUN 14   CREATININE 0.52*   GLUCOSE 94     Recent Labs     03/08/25  0900   AST 20   ALT 15   BILITOT <0.1*   ALKPHOS 128*     Recent Labs     03/08/25  0900   PROTIME 9.5   INR 0.9     Medications: Reviewed 03/08/25  Imaging: Reviewed 03/08/25   Most Recent XR  XR KNEE LEFT (MIN 4 VIEWS) 07/22/2024    Narrative  Weight Bearing.  Views: Standing AP, Ramirez-Perez, Lat, Alvarez.    Impression  Impression: No acute finding.    Most Recent CT  CTA HEAD NECK W CONTRAST 03/08/2025    Narrative  CLINICAL HISTORY: headache and left sided weakness    EXAMINATION:  CT ANGIOGRAPHY HEAD AND NECK, CT PERFUSION    COMPARISON: None    TECHNIQUE:  Following the uneventful administration of iodinated contrast  material, axial CT angiography of the head and neck was performed. Delayed axial  images through the head were also obtained. Coronal and sagittal reconstructions  were obtained. Manual postprocessing of images was performed. 3-D  Sagittal  maximal intensity projection images were obtained.  3-D Coronal maximal  intensity projections were obtained. CT brain perfusion was performed with  generation of hemodynamic maps of multiple parameters, including cerebral blood  flow, cerebral blood volume, mean transit time, and TMAX. CT dose reduction was  achieved through use of a

## 2025-03-09 ENCOUNTER — APPOINTMENT (OUTPATIENT)
Facility: HOSPITAL | Age: 60
DRG: 062 | End: 2025-03-09

## 2025-03-09 LAB
CHOLEST SERPL-MCNC: 199 MG/DL
ERYTHROCYTE [DISTWIDTH] IN BLOOD BY AUTOMATED COUNT: 12.3 % (ref 11.5–14.5)
EST. AVERAGE GLUCOSE BLD GHB EST-MCNC: 91 MG/DL
HBA1C MFR BLD: 4.8 % (ref 4–5.6)
HCT VFR BLD AUTO: 38.4 % (ref 35–47)
HDLC SERPL-MCNC: 90 MG/DL
HDLC SERPL: 2.2 (ref 0–5)
HGB BLD-MCNC: 12.3 G/DL (ref 11.5–16)
LDLC SERPL CALC-MCNC: 88.2 MG/DL (ref 0–100)
MCH RBC QN AUTO: 33 PG (ref 26–34)
MCHC RBC AUTO-ENTMCNC: 32 G/DL (ref 30–36.5)
MCV RBC AUTO: 102.9 FL (ref 80–99)
NRBC # BLD: 0 K/UL (ref 0–0.01)
NRBC BLD-RTO: 0 PER 100 WBC
PLATELET # BLD AUTO: 279 K/UL (ref 150–400)
PMV BLD AUTO: 9.5 FL (ref 8.9–12.9)
RBC # BLD AUTO: 3.73 M/UL (ref 3.8–5.2)
TRIGL SERPL-MCNC: 104 MG/DL
VLDLC SERPL CALC-MCNC: 20.8 MG/DL
WBC # BLD AUTO: 6.5 K/UL (ref 3.6–11)

## 2025-03-09 PROCEDURE — APPNB45 APP NON BILLABLE 31-45 MINUTES

## 2025-03-09 PROCEDURE — 2500000003 HC RX 250 WO HCPCS: Performed by: STUDENT IN AN ORGANIZED HEALTH CARE EDUCATION/TRAINING PROGRAM

## 2025-03-09 PROCEDURE — 2500000003 HC RX 250 WO HCPCS: Performed by: PHYSICIAN ASSISTANT

## 2025-03-09 PROCEDURE — 97165 OT EVAL LOW COMPLEX 30 MIN: CPT

## 2025-03-09 PROCEDURE — 80061 LIPID PANEL: CPT

## 2025-03-09 PROCEDURE — 2060000000 HC ICU INTERMEDIATE R&B

## 2025-03-09 PROCEDURE — 70551 MRI BRAIN STEM W/O DYE: CPT

## 2025-03-09 PROCEDURE — 97161 PT EVAL LOW COMPLEX 20 MIN: CPT

## 2025-03-09 PROCEDURE — 83036 HEMOGLOBIN GLYCOSYLATED A1C: CPT

## 2025-03-09 PROCEDURE — 6360000002 HC RX W HCPCS: Performed by: PHYSICIAN ASSISTANT

## 2025-03-09 PROCEDURE — 6370000000 HC RX 637 (ALT 250 FOR IP): Performed by: PHYSICIAN ASSISTANT

## 2025-03-09 PROCEDURE — 85027 COMPLETE CBC AUTOMATED: CPT

## 2025-03-09 PROCEDURE — 97530 THERAPEUTIC ACTIVITIES: CPT

## 2025-03-09 PROCEDURE — 97116 GAIT TRAINING THERAPY: CPT

## 2025-03-09 RX ORDER — LORAZEPAM 0.5 MG/1
0.5 TABLET ORAL ONCE
Status: DISCONTINUED | OUTPATIENT
Start: 2025-03-09 | End: 2025-03-09

## 2025-03-09 RX ORDER — ALPRAZOLAM 0.5 MG
0.5 TABLET ORAL ONCE
Status: DISCONTINUED | OUTPATIENT
Start: 2025-03-09 | End: 2025-03-10 | Stop reason: HOSPADM

## 2025-03-09 RX ORDER — ALPRAZOLAM 0.5 MG
1 TABLET ORAL ONCE
Status: DISCONTINUED | OUTPATIENT
Start: 2025-03-09 | End: 2025-03-10 | Stop reason: HOSPADM

## 2025-03-09 RX ADMIN — ASPIRIN 81 MG CHEWABLE TABLET 81 MG: 81 TABLET CHEWABLE at 10:52

## 2025-03-09 RX ADMIN — BUTALBITAL, ACETAMINOPHEN AND CAFFEINE 1 TABLET: 325; 50; 40 TABLET ORAL at 16:48

## 2025-03-09 RX ADMIN — Medication 10 ML: at 08:12

## 2025-03-09 RX ADMIN — Medication 20 ML: at 21:00

## 2025-03-09 RX ADMIN — SODIUM CHLORIDE, PRESERVATIVE FREE 10 ML: 5 INJECTION INTRAVENOUS at 08:12

## 2025-03-09 RX ADMIN — ENOXAPARIN SODIUM 40 MG: 100 INJECTION SUBCUTANEOUS at 10:52

## 2025-03-09 RX ADMIN — ALPRAZOLAM 0.5 MG: 0.5 TABLET ORAL at 08:28

## 2025-03-09 RX ADMIN — ALPRAZOLAM 0.5 MG: 0.5 TABLET ORAL at 16:48

## 2025-03-09 RX ADMIN — ALPRAZOLAM 0.5 MG: 0.5 TABLET ORAL at 08:27

## 2025-03-09 RX ADMIN — ALPRAZOLAM 0.5 MG: 0.5 TABLET ORAL at 00:00

## 2025-03-09 RX ADMIN — BUTALBITAL, ACETAMINOPHEN AND CAFFEINE 1 TABLET: 325; 50; 40 TABLET ORAL at 08:27

## 2025-03-09 ASSESSMENT — PAIN DESCRIPTION - PAIN TYPE: TYPE: CHRONIC PAIN

## 2025-03-09 ASSESSMENT — PAIN SCALES - GENERAL
PAINLEVEL_OUTOF10: 0
PAINLEVEL_OUTOF10: 3

## 2025-03-09 ASSESSMENT — PAIN DESCRIPTION - DESCRIPTORS: DESCRIPTORS: ACHING

## 2025-03-09 ASSESSMENT — PAIN DESCRIPTION - ORIENTATION: ORIENTATION: ANTERIOR

## 2025-03-09 ASSESSMENT — PAIN DESCRIPTION - LOCATION: LOCATION: HEAD
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
## 2025-03-09 NOTE — PROGRESS NOTES
SLP Contact Note    Update: MRI negative. Will sign off.    Consult received and chart reviewed in preparation for evaluation. Patient being worked-up for stroke. CT negative for acute infarct.  NIH 1. Pt passed swallow screen and has been initiated on diet.  Therefore, will await MRI and follow up for evaluation as indicated.      Chandni Galdamez M.Ed, PhD(c), CCC-SLP (pronouns: she/her/hers)  Speech-Language Pathologist

## 2025-03-09 NOTE — H&P
History and Physical    Date of Service:  3/9/2025  Primary Care Provider: Efrain Tomlin MD  Source of information: The patient and Chart review    Chief Complaint: Extremity Weakness      Hospital course:   Brie Velazco is a 59 y.o. female with migraines, anxiety, h/o remote TBI who was admitted by [facility] to ICU on 3/8/2025 with acute stroke s/p TNK, L tongue numbness, LUE/LLE paresthesias, and hypertensive emergency. CTA/CTP 3/8 focal stenosis of mid left cervical vertebral artery  but no LVO. Pt received TNK on 3/8 at 0931. L tongue numbness and LUE paresthesia have improved, and LLE numbness has resolved. Neurology was consulted. LDL 88 with goal <70, A1c 4.8. She was started on high intensity atorvastatin. Goal -180. OT recommended outpatient OT for coordination. PT recommended outpatient PT for strengthening/balance. MRI brain 3/9 no acute process; bilateral mastoid effusions, mild chronic microangiopathic disease. TTE was ordered.  was consulted for transfer of care today.     Pt was seen/examined at bedside in ICU. She only c/o headache at this time and denies CP, SOB, cough, f/c/n/v, abdominal pain, diarrhea, constipation, dysuria, AV/speech/swallow changes, numbness, tingling, weakness.      REVIEW OF SYSTEMS:  Pertinent items are noted in the History of Present Illness.     No past medical history on file.   No past surgical history on file.  Prior to Admission medications    Not on File     No Known Allergies   No family history on file.   Social History:     Social Drivers of Health     Tobacco Use: High Risk (7/22/2024)    Received from [employer]    Patient History     Smoking Tobacco Use: Some Days     Smokeless Tobacco Use: Never     Passive Exposure: Not on file   Alcohol Use: Not on file   Financial Resource Strain: Not on file   Food Insecurity: Not on file   Transportation Needs: Not on file   Physical Activity: Not on file   Stress: Not on file   Social Connections:  Not on file   Intimate Partner Violence: Not on file   Depression: Not on file   Housing Stability: Not on file   Interpersonal Safety: Not on file   Utilities: Not on file        Medications were reconciled to the best of my ability given all available resources at the time of admission. Route is PO if not otherwise noted.     Family and social history were personally reviewed, all pertinent and relevant details are outlined as above.    Objective:   BP (!) 104/54   Pulse 92   Temp 98.4 °F (36.9 °C)   Resp 15   Ht 1.651 m (5' 5\")   Wt 52.2 kg (115 lb 1.3 oz)   SpO2 (!) 82%   BMI 19.15 kg/m²         PHYSICAL EXAM:   General: awake, NAD   HEENT: NCAT, PERRL, MMM   Neck: supple, no masses   Lungs: clear to auscultation bilaterally    CVS: regular rhythm, normal rate, no m/r/g appreciated, no peripheral edema   Abd: +BS, soft, NT, ND   MSK: DE JESUS   Neuro/Psych: pleasant mood and affect, Ox3, CN II-XII grossly intact, responds appropriately to questions and commands   Skin: warm, dry     Data Review:   I have independently reviewed and interpreted patient's lab and all other diagnostic data    Abnormal Labs Reviewed   COMPREHENSIVE METABOLIC PANEL - Abnormal; Notable for the following components:       Result Value    Chloride 111 (*)     Creatinine 0.52 (*)     BUN/Creatinine Ratio 27 (*)     Total Bilirubin <0.1 (*)     Alk Phosphatase 128 (*)     Albumin 3.4 (*)     Albumin/Globulin Ratio 0.9 (*)     All other components within normal limits   CBC - Abnormal; Notable for the following components:    RBC 3.73 (*)     .9 (*)     All other components within normal limits     IMAGING:   MRI brain without contrast   Final Result      1. No findings of an acute intracranial abnormality.   2. Bilateral mastoid effusions.   3. Mild chronic microangiopathic disease.      Electronically signed by Dewayne Henriquez      CTA HEAD NECK W CONTRAST   Final Result   No acute large vessel occlusion or perfusion abnormality.  Recommend an assessment from physical therapy and/or occupational therapy  ANTICIPATED DISCHARGE: 24-48 hours.; pending TTE  ANTICIPATED DISPOSITION: Home with referral to outpatient PT/OT  EMERGENCY CONTACT/SURROGATE DECISION MAKER: none on file; family updated at bedside this evening    Signed By: Ellie Lopes MD     March 9, 2025

## 2025-03-09 NOTE — PROGRESS NOTES
CRITICAL CARE ADMISSION NOTE      Name: Brie Velazco   : 1965   MRN: [id number]   Date: 3/9/2025      Reason for ICU Admission: Acute CVA status post TNK    ASSESSMENT and PLAN     Acute CVA   - Admit to ICU s/p TNK given 3/8 at 0931   - Neurology following   - Q1H neurochecks   - STAT head CT for acute neurological deterioration, new headache or N/V   - BP goal -180, DBP    - MRI brain 24 hours post TNK, today 3/9   - Hold any ASA/anticoagulants for at least 24 hours, until repeat imaging obtained   - Repeat head CT today 3/9   - 2D Echo   - PT/OT consults   - Speech language pathology consultation    Hypertensive emergency   - Will start nicardipine gtt if exceeds .   - Labetalol 10 mg PRN for goal systolic blood pressure less than 180    Generalized anxiety disorder   - Home Xanax ordered 0.5mg BID      HISTORY OF PRESENT ILLNESS:     This is a 59-year-old female with previous medical history of migraines followed outpatient by neurology, anxiety, remote TBI.  She reports she was awake this morning around 6 AM and reports no complaints at that time.  She went back to bed and woke up with severe headache.  She immediately presented to [facility] emergency department.  She is admitted to the ICU for post TNK monitoring.  She reports left-sided tongue numbness improved since arrival.  She reports paresthesias in the left upper extremity improved since admission.  And she reports numbness of the left lower extremity which has resolved.    3/9: No acute events overnight. Symptoms improved.    ICU DAILY CHECKLIST     Code Status: Full  DVT Prophylaxis: Lovenox after negative head CT  T/L/D: Peripheral IV  SUP: Not indicated  Diet: N.p.o. speech-language pathology consulted.  Activity Level: Bedrest elevate head of bed  Disposition: Likely transfer out of ICU in next 24-48 hours.  Multidisciplinary Rounds Completed: Pending  Patient/Family Updated: Yes    Review of Systems:  03/08/25  0900 03/09/25  0608   WBC 8.5 6.5   RBC 4.03 3.73*   HGB 12.8 12.3   HCT 38.4 38.4   MCV 95.3 102.9*   RDW 12.2 12.3    279     Recent Labs     03/08/25  0900      K 4.0   *   CO2 26   BUN 14   CREATININE 0.52*   GLUCOSE 94     Recent Labs     03/08/25  0900   AST 20   ALT 15   BILITOT <0.1*   ALKPHOS 128*     Recent Labs     03/08/25  0900   PROTIME 9.5   INR 0.9     Medications: Reviewed 03/09/25  Imaging: Reviewed 03/09/25   Most Recent XR  XR KNEE LEFT (MIN 4 VIEWS) 07/22/2024    Narrative  Weight Bearing.  Views: Standing AP, [place], Lat, Alvarez.    Impression  Impression: No acute finding.    Most Recent CT  CTA HEAD NECK W CONTRAST 03/08/2025    Narrative  CLINICAL HISTORY: headache and left sided weakness    EXAMINATION:  CT ANGIOGRAPHY HEAD AND NECK, CT PERFUSION    COMPARISON: None    TECHNIQUE:  Following the uneventful administration of iodinated contrast  material, axial CT angiography of the head and neck was performed. Delayed axial  images through the head were also obtained. Coronal and sagittal reconstructions  were obtained. Manual postprocessing of images was performed. 3-D  Sagittal  maximal intensity projection images were obtained.  3-D Coronal maximal  intensity projections were obtained. CT brain perfusion was performed with  generation of hemodynamic maps of multiple parameters, including cerebral blood  flow, cerebral blood volume, mean transit time, and TMAX. CT dose reduction was  achieved through use of a standardized protocol tailored for this examination  and automatic exposure control for dose modulation.    This study was analyzed by the [employer] algorithm.    FINDINGS:    DELAYED ENHANCEMENT HEAD CT  No intra or extra-axial mass or collection. Ventricles are normal in size and  configuration. The basal cisterns are patent.    Dural venous sinuses are patent. No abnormal parenchymal or meningeal  enhancement.    Mastoid air cells and paranasal sinuses  [Annual] : an annual visit. [Amenorrhea] : amenorrhea

## 2025-03-09 NOTE — PLAN OF CARE
Problem: Physical Therapy - Adult  Goal: By Discharge: Performs mobility at highest level of function for planned discharge setting.  See evaluation for individualized goals.  Description: FUNCTIONAL STATUS PRIOR TO ADMISSION: Patient was independent and active without use of DME.    HOME SUPPORT PRIOR TO ADMISSION: The patient lived with her spouse.    Physical Therapy Goals  Initiated 3/9/2025  1.  Patient will move from supine to sit and sit to supine, scoot up and down, and roll side to side in bed with independence within 7 day(s).    2.  Patient will perform sit to stand with supervision/set-up within 7 day(s).  3.  Patient will transfer from bed to chair and chair to bed with supervision/set-up using the least restrictive device within 7 day(s).  4.  Patient will ambulate with supervision/set-up for 300 feet with the least restrictive device within 7 day(s).   5.  Patient will ascend/descend 12 stairs with handrail(s) with supervision/set-up within 7 day(s).  6.  Patient will improve Carroll Balance score by 7 points within 7 days.    Outcome: Progressing   PHYSICAL THERAPY EVALUATION    Patient: Brie Velazco (59 y.o. female)  Date: 3/9/2025  Primary Diagnosis: Ischemic stroke (HCC) [I63.9]  Cerebrovascular accident (CVA), unspecified mechanism (HCC) [I63.9]       Precautions: Restrictions/Precautions  Restrictions/Precautions: Fall Risk (-180)            ASSESSMENT :   DEFICITS/IMPAIRMENTS:   The patient is limited by decreased functional mobility, strength, coordination, balance, and gait following admission for L weakness, now s/p TNK on 3/8. CT negative, MRI pending at time of eval. At baseline, she lived at home with her spouse and was indep with ADLs/mobility without AD.    Today, she was able to complete bed mobility without assist and demo good sitting balance once up. Completed sit<>stands with CGA for safety (slightly impulsive) and cues to ensure integration of L UE. Gait training  Outstretched Arm While Standing: Can reach forward 12 cm (5 inches)  9.  Object from Floor from a Standing Position: Able to  slipper but needs supervision  10. Turning to Look Behind Over Left and Right Shoulders While Standing: Looks behind from both sides and weight shifts well  11. Turn 360 Degrees: Able to turn 360 degrees safely in 4 seconds or less  12. Place Alternate Foot on Step or Stool While Standing Unsupported: Able to stand independently and complete 8 steps in greater than 20 seconds  13. Standing Unsupported One Foot in Front: Needs help to step but can hold 15 seconds  14. Standing on One Leg: Tries to lift leg unable to hold 3 seconds but remains standing independently  Carroll Balance Score: 46         56=Maximum possible score;   0-20=High fall risk  21-40=Moderate fall risk   41-56=Low fall risk                                                                                                                                                                                                                                     Pain Ratin/10   Pain Intervention(s):       Activity Tolerance:   Good and requires rest breaks    After treatment:   Patient left in no apparent distress sitting up in chair, Call bell within reach, Bed/ chair alarm activated, Caregiver / family present, Heels elevated for pressure relief, and Updated patient's board on functional status and mobility recommendations    COMMUNICATION/EDUCATION:   The patient's plan of care was discussed with: occupational therapist and registered nurse    Patient Education  Education Given To: Patient;Family  Education Provided: Role of Therapy;Plan of Care  Education Method: Verbal  Barriers to Learning: None  Education Outcome: Verbalized understanding    Thank you for this referral.  Gi De La Cruz, PT, DPT  Minutes: 25      Physical Therapy Evaluation Charge Determination   History Examination Presentation Decision-Making

## 2025-03-10 ENCOUNTER — APPOINTMENT (OUTPATIENT)
Facility: HOSPITAL | Age: 60
DRG: 062 | End: 2025-03-10

## 2025-03-10 ENCOUNTER — TELEPHONE (OUTPATIENT)
Age: 60
End: 2025-03-10

## 2025-03-10 VITALS
BODY MASS INDEX: 18.37 KG/M2 | TEMPERATURE: 97.6 F | RESPIRATION RATE: 18 BRPM | SYSTOLIC BLOOD PRESSURE: 117 MMHG | HEIGHT: 65 IN | DIASTOLIC BLOOD PRESSURE: 62 MMHG | OXYGEN SATURATION: 97 % | HEART RATE: 87 BPM | WEIGHT: 110.23 LBS

## 2025-03-10 LAB
ANION GAP SERPL CALC-SCNC: 2 MMOL/L (ref 2–12)
BASOPHILS # BLD: 0.02 K/UL (ref 0–0.1)
BASOPHILS NFR BLD: 0.3 % (ref 0–1)
BUN SERPL-MCNC: 12 MG/DL (ref 6–20)
BUN/CREAT SERPL: 22 (ref 12–20)
CALCIUM SERPL-MCNC: 8.5 MG/DL (ref 8.5–10.1)
CHLORIDE SERPL-SCNC: 113 MMOL/L (ref 97–108)
CO2 SERPL-SCNC: 26 MMOL/L (ref 21–32)
CREAT SERPL-MCNC: 0.54 MG/DL (ref 0.55–1.02)
DIFFERENTIAL METHOD BLD: ABNORMAL
EOSINOPHIL # BLD: 0.19 K/UL (ref 0–0.4)
EOSINOPHIL NFR BLD: 3 % (ref 0–7)
ERYTHROCYTE [DISTWIDTH] IN BLOOD BY AUTOMATED COUNT: 11.9 % (ref 11.5–14.5)
GLUCOSE SERPL-MCNC: 116 MG/DL (ref 65–100)
HCT VFR BLD AUTO: 36.8 % (ref 35–47)
HGB BLD-MCNC: 12.2 G/DL (ref 11.5–16)
IMM GRANULOCYTES # BLD AUTO: 0.02 K/UL (ref 0–0.04)
IMM GRANULOCYTES NFR BLD AUTO: 0.3 % (ref 0–0.5)
LYMPHOCYTES # BLD: 2.68 K/UL (ref 0.8–3.5)
LYMPHOCYTES NFR BLD: 42.8 % (ref 12–49)
MAGNESIUM SERPL-MCNC: 2 MG/DL (ref 1.6–2.4)
MCH RBC QN AUTO: 32.2 PG (ref 26–34)
MCHC RBC AUTO-ENTMCNC: 33.2 G/DL (ref 30–36.5)
MCV RBC AUTO: 97.1 FL (ref 80–99)
MONOCYTES # BLD: 0.64 K/UL (ref 0–1)
MONOCYTES NFR BLD: 10.2 % (ref 5–13)
NEUTS SEG # BLD: 2.71 K/UL (ref 1.8–8)
NEUTS SEG NFR BLD: 43.4 % (ref 32–75)
NRBC # BLD: 0 K/UL (ref 0–0.01)
NRBC BLD-RTO: 0 PER 100 WBC
PHOSPHATE SERPL-MCNC: 3.4 MG/DL (ref 2.6–4.7)
PLATELET # BLD AUTO: 241 K/UL (ref 150–400)
PMV BLD AUTO: 9.5 FL (ref 8.9–12.9)
POTASSIUM SERPL-SCNC: 4.2 MMOL/L (ref 3.5–5.1)
RBC # BLD AUTO: 3.79 M/UL (ref 3.8–5.2)
SODIUM SERPL-SCNC: 141 MMOL/L (ref 136–145)
WBC # BLD AUTO: 6.3 K/UL (ref 3.6–11)

## 2025-03-10 PROCEDURE — 6370000000 HC RX 637 (ALT 250 FOR IP): Performed by: PHYSICIAN ASSISTANT

## 2025-03-10 PROCEDURE — 85025 COMPLETE CBC W/AUTO DIFF WBC: CPT

## 2025-03-10 PROCEDURE — 2500000003 HC RX 250 WO HCPCS: Performed by: PHYSICIAN ASSISTANT

## 2025-03-10 PROCEDURE — 2500000003 HC RX 250 WO HCPCS: Performed by: STUDENT IN AN ORGANIZED HEALTH CARE EDUCATION/TRAINING PROGRAM

## 2025-03-10 PROCEDURE — 83735 ASSAY OF MAGNESIUM: CPT

## 2025-03-10 PROCEDURE — 80048 BASIC METABOLIC PNL TOTAL CA: CPT

## 2025-03-10 PROCEDURE — 84100 ASSAY OF PHOSPHORUS: CPT

## 2025-03-10 PROCEDURE — 99232 SBSQ HOSP IP/OBS MODERATE 35: CPT

## 2025-03-10 RX ORDER — BUTALBITAL, ACETAMINOPHEN AND CAFFEINE 50; 325; 40 MG/1; MG/1; MG/1
1 TABLET ORAL EVERY 4 HOURS PRN
COMMUNITY
Start: 2024-07-08

## 2025-03-10 RX ORDER — CARISOPRODOL 350 MG/1
350 TABLET ORAL 4 TIMES DAILY PRN
COMMUNITY

## 2025-03-10 RX ORDER — ALPRAZOLAM 0.5 MG
0.5 TABLET ORAL 2 TIMES DAILY PRN
COMMUNITY
Start: 2024-07-09

## 2025-03-10 RX ORDER — DEXTROAMPHETAMINE SACCHARATE, AMPHETAMINE ASPARTATE, DEXTROAMPHETAMINE SULFATE, AND AMPHETAMINE SULFATE 5; 5; 5; 5 MG/1; MG/1; MG/1; MG/1
20 TABLET ORAL 2 TIMES DAILY
COMMUNITY

## 2025-03-10 RX ORDER — ASPIRIN 81 MG/1
81 TABLET, CHEWABLE ORAL DAILY
Qty: 30 TABLET | Refills: 3 | Status: SHIPPED | OUTPATIENT
Start: 2025-03-11

## 2025-03-10 RX ADMIN — ALPRAZOLAM 0.5 MG: 0.5 TABLET ORAL at 04:12

## 2025-03-10 RX ADMIN — BUTALBITAL, ACETAMINOPHEN AND CAFFEINE 1 TABLET: 325; 50; 40 TABLET ORAL at 04:12

## 2025-03-10 RX ADMIN — SODIUM CHLORIDE, PRESERVATIVE FREE 10 ML: 5 INJECTION INTRAVENOUS at 08:36

## 2025-03-10 RX ADMIN — ASPIRIN 81 MG CHEWABLE TABLET 81 MG: 81 TABLET CHEWABLE at 08:35

## 2025-03-10 RX ADMIN — ALPRAZOLAM 0.5 MG: 0.5 TABLET ORAL at 08:35

## 2025-03-10 RX ADMIN — Medication 10 ML: at 08:36

## 2025-03-10 ASSESSMENT — PAIN SCALES - GENERAL
PAINLEVEL_OUTOF10: 0
PAINLEVEL_OUTOF10: 0
PAINLEVEL_OUTOF10: 5

## 2025-03-10 ASSESSMENT — PAIN DESCRIPTION - DESCRIPTORS: DESCRIPTORS: ACHING

## 2025-03-10 ASSESSMENT — PAIN DESCRIPTION - PAIN TYPE: TYPE: CHRONIC PAIN

## 2025-03-10 ASSESSMENT — PAIN DESCRIPTION - ORIENTATION: ORIENTATION: ANTERIOR

## 2025-03-10 ASSESSMENT — PAIN DESCRIPTION - LOCATION: LOCATION: HEAD

## 2025-03-10 NOTE — PROGRESS NOTES
Neurology Progress Note    Admit Date: 3/8/2025   LOS: 2 days      Daily Progress Note: 3/10/2025    Assessment:     60 yo female with pmhx of TBI and migraines presented to the ED with complaints of intractable occipital headache with numbness to left side of tongue and going down left arm. Pt took home breakthrough migraine medication and 325mg ASA. When symptoms did not kathy, she came to the ED. LKW 0600. Initial /108. NIHSS 5. CTH negative. CTA H/N no LVO or perfusion abnormality. Given TNK at 0931. Hemoglobin A1c is 4.8 and at goal of normoglycemic.  LDL is 88.2, goal is < 70, Atorvastatin 80 mg nightly started.  Echo pending. MRI Brain / 24 hour post TNK imaging is negative for acute intracranial abnormality with mild chronic microangiopathic disease. Initially admitted to ICU for further monitoring and workup of acute stroke. Currently awaiting bed on floor and transferred to hospitalist service. OT/PT/Speech pending further work up and recommendations.      Plan:     - Neuro checks per unit protocol  - Can continue aspirin 81 mg daily to decrease risk  - A1C is 4.8 and is at goal of normoglycemic    - LDL is 88.2, discussion about risks vs benefits and goal of < 70 needs to likely continue as patient has questions about the benefits of Atorvastatin for her overall care.  - MRI brain revealed no acute process  - ECHO ordered and pending - may not be required prior to discharge and could possibly be done outpatient  - PT/OT/SLP evals with final recommendations pending - coordination likely related to previous TBI  - Stroke education  - SBP goal can be normalized  - Patient would like to follow up with neurology in clinic in regards to further discussion about her TBI and continued exacerbation of symptoms along with migraine symptoms evolving.    This plan is tentative and can be changed upon Dr. Kumar's examination later this morning.    Plan d/w , primary nurse,  Nutrition Assessment   Reason for Consult/Assessment: Follow up, Parenteral nutrition   Diagnosis and Hx: Reviewed  Pertinent Nutrition History: reviewed  Pertinent Nutrition Information: reviewed                               Diet Order: TPN/PPN, Clear liquid   TPN/PPN: continue 300 gm dextrose 100 gm protein daily lipid   Decreased volume starting 11/23 to 62.5 ml/hr (1500 ml)              Diet tolerance: Other (comment)tolerating water and popsicles only this week  Food Allergies: No    Demographic/Anthropometrics Information  Gender: female   Patient Age: 61 year old  Height:   Ht Readings from Last 1 Encounters:   11/19/22 5' 2\" (1.575 m)      Weight:   Wt Readings from Last 1 Encounters:   11/23/22 80.1 kg      BMI:   BMI Readings from Last 1 Encounters:   11/23/22 32.30 kg/m²          % Weight Change: admit weight: 82.8 kg (-17.4 kg/21% in 5 months)           Estimated Needs:  Calculated Energy Needs: 5871-0213  kcal               Calculated protein needs: 78-98  g    Calculated Fluid Needs: 1ml/kcal              NFPE  Nutrition Focused Physical Exam  Physical Exam Completed: Partial  Reason Not Completed: Other        Skin Assessment/Wounds  Edema:  (2+ bilateral LE edema, non pitting bilateral upper extremity edema)             TREATMENT PLAN: Monitoring & Interventions   Intervention: Coordination of nutrition care by a nutrition professional, Parenteral nutrition/IV fluids   Coordination of nutrition care: secure chat with [facility]     Meals & snacks: continue clear liquid diet as tolerated  Parenteral Nutrition Order:Continue-Adjust volume from 75 ml/hr to 62.5 ml/hr on 11/23  Access Site: PICC   Lipids Provided by Parenteral Nutrition: 250 mL daily lipids  Protein Provided by Parenteral Nutrition: 100 gm  Dextrose Provided by Parenteral Nutrition: 300 gm  Calories Provided by Parenteral Nutrition: 1920 total kcal  IV Fluids: none          Goal: Achieve normal electrolytes, Meet needs with  parenteral nutrition, Maintain or improve weight   Intervention goal status: Other (comment)-meeting goal of meeting needs with TPN, digression from goal on weight (trending up, will decrease TPN volume); goal on electolytes just continued-WDL but trending up this week  Time frame to achieve goal: Ongoing     Dietitian will monitor: Anthropometric Measurements, Parenteral nutrition intake, Biochemical data, medical tests, procedures      Nutrition Diagnosis / PES  Nutrition Diagnosis: Malnutrition  Malnutrition in the context of acute illness or injury: Severe  Related to: Altered GI function/GI disorder, Diarrhea, Decreased intake (recurrent GI bleed, increased nutrient needs for malignancy, healing)   As evidenced by: Calculated needs, TPN/PPN not meeting estimated needs   Malnutrition diagnosis acute illness/injury; severe: Energy intake of <50% of estimated energy requirement for >/equals 5 days, Weight loss of >7.5%/3 months  Primary Nutrition Diagnosis status: Active nutrition diagnosis                    middle cerebral artery: Patent Left middle cerebral artery: Patent Posterior communicating arteries: Patent Posterior cerebral arteries: Patent Basilar artery: Patent Distal vertebral arteries: Patent No intracranial aneurysm CT Perfusion: Normal CT perfusion.     No acute large vessel occlusion or perfusion abnormality. Focal stenosis of mid left cervical vertebral artery Electronically signed by HERNESTO NOVAK    CT HEAD WO CONTRAST  Result Date: 3/8/2025  EXAM: CT CODE NEURO HEAD WO CONTRAST INDICATION: left sided numbness with headache COMPARISON: None. CONTRAST: None. TECHNIQUE: Unenhanced CT of the head was performed using 5 mm images. Brain and bone windows were generated. Coronal and sagittal reformats. CT dose reduction was achieved through use of a standardized protocol tailored for this examination and automatic exposure control for dose modulation.  FINDINGS: The ventricles and sulci are normal in size, shape and configuration. There is no significant white matter disease. There is no intracranial hemorrhage, extra-axial collection, or mass effect. The basilar cisterns are open. No CT evidence of acute infarct. The bone windows demonstrate no abnormalities. The visualized portions of the paranasal sinuses and mastoid air cells are clear.     No acute intracranial abnormality. Electronically signed by PATRICIA HERNANDEZ      I have spent 45 minutes of time involved in chart review, lab review, imaging review, consultations with specialists and attendings, family discussion/decision making and documentation.     Signed By: DEVYN Jaramillo - NP, Neurovascular Nurse Practitioner    March 10, 2025

## 2025-03-10 NOTE — TELEPHONE ENCOUNTER
Pt needs a hospital follow up appointment  Provider: any  Location: [facility]  In person or virtual: In person  When: 6-8 weeks  Diagnosis/reason for follow up:  post TNK, hx of TBI with coordination and PTSD  Additional information: Spouse is Prateek Dacosta at [phone]

## 2025-03-10 NOTE — CARE COORDINATION
Care Management Initial Assessment       RUR: 7%   Readmission? No     03/10/25 0938   Service Assessment   Patient Orientation Alert and Oriented;Person;Place;Situation;Self   Cognition Alert   History Provided By Patient   Primary Caregiver Self   Support Systems Spouse/Significant Other  ( Prateek Velazco [phone])   Patient's Healthcare Decision Maker is: Legal Next of Kin   PCP Verified by CM Yes  (Dr Tomlin)   Last Visit to PCP Within last 3 months   Prior Functional Level Independent in ADLs/IADLs   Current Functional Level Independent in ADLs/IADLs   Can patient return to prior living arrangement Yes   Ability to make needs known: Good   Family able to assist with home care needs: Yes   Would you like for me to discuss the discharge plan with any other family members/significant others, and if so, who? Yes  (Spouse)   Community Resources None   Social/Functional History   Lives With Spouse   Type of Home House   Home Layout Two level;Bed/Bath upstairs   Home Access Stairs to enter without rails   Entrance Stairs - Number of Steps 3   Entrance Stairs - Rails None   Bathroom Shower/Tub Walk-in shower   Bathroom Toilet Standard   Bathroom Equipment None   Home Equipment None   Receives Help From Family   Prior Level of Assist for ADLs Independent   Prior Level of Assist for Homemaking Independent   Ambulation Assistance Independent   Prior Level of Assist for Transfers Independent   Active  No   Patient's  Info Family   Mode of Transportation Car   Occupation Unemployed   Discharge Planning   Type of Residence House   Living Arrangements Spouse/Significant Other   Current Services Prior To Admission None   Potential Assistance Needed N/A   Potential Assistance Purchasing Medications No   Patient expects to be discharged to: House   History of falls? 0   Services At/After Discharge   Services At/After Discharge None     Patient presented to the ED from home with left sided numbness, severe  headache and nausea. Patient has a history of a TBI. Per notes patient admitted with an acute stroke, received TNK.   CM met with patient and her spouse to introduce self and explain role. Patient was independent with ADL's . Patient does not drive r/t history of a TBI. Per patient she has no previous HH/DME or IPR needs. She confirmed her PCP to be Dr Tomlin and she is seen monthly in the office for lab work. CM confirmed demographic information. Per patient she has health insurance, card is in the car, patient's  to bring the card in. Therapy is recommending outpatient PT/OT, she will need a prescription prior to discharge.   Leigh Thompson RN,Care Management

## 2025-03-10 NOTE — DISCHARGE SUMMARY
Discharge Summary   Please note that this dictation was completed with [employer], the computer voice recognition software.  Quite often unanticipated grammatical, syntax, homophones, and other interpretive errors are inadvertently transcribed by the computer software.  Please disregard these errors.  Please excuse any errors that have escaped final proofreading.    PATIENT ID: Brie Velazco  MRN: [id number]   YOB: 1965    DATE OF ADMISSION: 3/8/2025  8:51 AM    DATE OF DISCHARGE: 3/10/2025  PRIMARY CARE PROVIDER: Efrain Tomlin MD         ATTENDING PHYSICIAN: Christopher Turner MD  DISCHARGING PROVIDER: Christopher Turner MD       CONSULTATIONS: IP CONSULT TO TELE-NEUROLOGY  IP CONSULT TO NEUROLOGY    PROCEDURES/SURGERIES: * No surgery found *    ADMITTING HPI from excerpted H&P   Brie Velazco is a 59 y.o. female with migraines, anxiety, h/o remote TBI who was admitted by [facility] to ICU on 3/8/2025 with acute stroke s/p TNK, L tongue numbness, LUE/LLE paresthesias, and hypertensive emergency. CTA/CTP 3/8 focal stenosis of mid left cervical vertebral artery  but no LVO. Pt received TNK on 3/8 at 0931. L tongue numbness and LUE paresthesia have improved, and LLE numbness has resolved. Neurology was consulted. LDL 88 with goal <70, A1c 4.8. She was started on high intensity atorvastatin. Goal -180. OT recommended outpatient OT for coordination. PT recommended outpatient PT for strengthening/balance. MRI brain 3/9 no acute process; bilateral mastoid effusions, mild chronic microangiopathic disease. TTE was ordered.  was consulted for transfer of care today.      Pt was seen/examined at bedside in ICU. She only c/o headache at this time and denies CP, SOB, cough, f/c/n/v, abdominal pain, diarrhea, constipation, dysuria, AV/speech/swallow changes, numbness, tingling, weakness          HOSPITAL COURSE & DISCHARGE DIAGNOSIS/ PLAN:         Suspected stroke s/p TNK (3/8 at 0930)  Complicated  hours.   Chest pain, shortness of breath, fever, chills, nausea, vomiting, diarrhea, change in mentation, falling, weakness, bleeding. Severe pain or pain not relieved by medications.  Or, any other signs or symptoms that you may have questions about.    DISPOSITION:    Home With:   OT  PT  HH  RN       Long term SNF/Inpatient Rehab   x Independent/assisted living    Hospice    Other:       PATIENT CONDITION AT DISCHARGE:     Functional status    Poor     Deconditioned    x Independent      Cognition    x Lucid     Forgetful     Dementia      Catheters/lines (plus indication)    Coles     PICC     PEG    x None      Code status    x Full code     DNR      PHYSICAL EXAMINATION AT DISCHARGE:    General : alert x 3, awake, no acute distress,   HEENT: PEERL, EOMI, moist mucus membrane, TM clear  Neck: supple, no JVD, no meningeal signs  Chest: Clear to auscultation bilaterally   CVS: S1 S2 heard, Capillary refill less than 2 seconds  Abd: soft/ Non tender, non distended, BS physiological,   Ext: no clubbing, no cyanosis, no edema, brisk 2+ DP pulses  Neuro/Psych: pleasant mood and affect, CN 2-12 grossly intact, sensory grossly within normal limit, Strength 5/5 in all extremities, DTR 1+ x 4  Skin: warm     CHRONIC MEDICAL DIAGNOSES:      Greater than 31 minutes were spent with the patient on counseling and coordination of care    Signed:   Christopher Turner MD  3/10/2025  2:03 PM

## 2025-04-09 NOTE — PROGRESS NOTES
Physician Progress Note      PATIENT:               NANDA DE JESUS  [facility] #:                  [id number]  :                       1965  ADMIT DATE:       3/8/2025 8:51 AM  DISCH DATE:        3/10/2025 11:04 AM  RESPONDING  PROVIDER #:        Christopher Turner MD          QUERY TEXT:    Hypertensive Emergency is documented in the medical record Discharge Summary   03/10. Please provide additional clinical indicators supportive of your   documentation. Or please document if the diagnosis of Hypertensive Emergency   has been ruled out after study.    The clinical indicators include:  - \"HTN Emergency, Will start nicardipine gtt if exceeds ; Labetalol 10   mg PRN for goal systolic blood pressure less than 180 (H&P )  - \" Hypertensive emergency resolved, Follow-up with PCP\" (DS 03/10)  - BP: : 200/108, 178/105, 153/73, 163/78, 185/101, 183/91, 161/87,   119/71, 142/90, 135/87, 141/77, 146/84, 140/86, 170/84, 162/88, 146/96,   144/90, 139/65, 146/94, 168/72, 175/83, 172/84, 173/121, 166/92, 164/78,   139/81  -treatment: nicardipine gtt,  Labetalol 10 mg PRN  Options provided:  -- Hypertensive Emergency present as evidenced by, Please document evidence.  -- Hypertensive Urgency confirmed, Hypertensive Emergency ruled out  -- Hypertension with Hypertensive Urgency confirmed, Hypertensive Emergency   ruled out  -- Hypertension only; Hypertensive Urgency and Hypertensive Emergency ruled   out  -- Other - I will add my own diagnosis  -- Disagree - Not applicable / Not valid  -- Disagree - Clinically unable to determine / Unknown  -- Refer to Clinical Documentation Reviewer    PROVIDER RESPONSE TEXT:    Hypertensive Urgency confirmed, Hypertensive Emergency ruled out    Query created by: Mitesh Saucedo on 2025 10:02 AM      Electronically signed by:  Christopher Turner MD 2025 7:15 AM